# Patient Record
Sex: MALE | Race: BLACK OR AFRICAN AMERICAN | NOT HISPANIC OR LATINO | Employment: UNEMPLOYED | URBAN - METROPOLITAN AREA
[De-identification: names, ages, dates, MRNs, and addresses within clinical notes are randomized per-mention and may not be internally consistent; named-entity substitution may affect disease eponyms.]

---

## 2021-11-15 ENCOUNTER — HISTORICAL (OUTPATIENT)
Dept: ADMINISTRATIVE | Facility: HOSPITAL | Age: 41
End: 2021-11-15

## 2021-11-18 ENCOUNTER — HISTORICAL (OUTPATIENT)
Dept: ADMINISTRATIVE | Facility: HOSPITAL | Age: 41
End: 2021-11-18

## 2021-11-18 LAB
EST. AVERAGE GLUCOSE BLD GHB EST-MCNC: 105.4 MG/DL
HBA1C MFR BLD: 5.3 %

## 2021-11-22 ENCOUNTER — HISTORICAL (OUTPATIENT)
Dept: ADMINISTRATIVE | Facility: HOSPITAL | Age: 41
End: 2021-11-22

## 2021-11-22 LAB — SARS-COV-2 AG RESP QL IA.RAPID: NEGATIVE

## 2021-11-23 ENCOUNTER — HISTORICAL (OUTPATIENT)
Dept: SURGERY | Facility: HOSPITAL | Age: 41
End: 2021-11-23

## 2021-12-08 ENCOUNTER — HISTORICAL (OUTPATIENT)
Dept: ADMINISTRATIVE | Facility: HOSPITAL | Age: 41
End: 2021-12-08

## 2022-01-05 ENCOUNTER — HISTORICAL (OUTPATIENT)
Dept: ADMINISTRATIVE | Facility: HOSPITAL | Age: 42
End: 2022-01-05

## 2022-02-16 ENCOUNTER — HISTORICAL (OUTPATIENT)
Dept: ADMINISTRATIVE | Facility: HOSPITAL | Age: 42
End: 2022-02-16

## 2022-03-10 ENCOUNTER — HISTORICAL (OUTPATIENT)
Dept: PHYSICAL THERAPY | Facility: HOSPITAL | Age: 42
End: 2022-03-10

## 2022-03-18 ENCOUNTER — HISTORICAL (OUTPATIENT)
Dept: PHYSICAL THERAPY | Facility: HOSPITAL | Age: 42
End: 2022-03-18

## 2022-03-21 ENCOUNTER — HISTORICAL (OUTPATIENT)
Dept: PHYSICAL THERAPY | Facility: HOSPITAL | Age: 42
End: 2022-03-21

## 2022-03-23 ENCOUNTER — HISTORICAL (OUTPATIENT)
Dept: PHYSICAL THERAPY | Facility: HOSPITAL | Age: 42
End: 2022-03-23

## 2022-03-23 ENCOUNTER — HISTORICAL (OUTPATIENT)
Dept: ADMINISTRATIVE | Facility: HOSPITAL | Age: 42
End: 2022-03-23

## 2022-04-07 ENCOUNTER — HISTORICAL (OUTPATIENT)
Dept: ADMINISTRATIVE | Facility: HOSPITAL | Age: 42
End: 2022-04-07
Payer: MEDICAID

## 2022-04-24 VITALS — WEIGHT: 222.69 LBS | HEIGHT: 72 IN | BODY MASS INDEX: 30.16 KG/M2

## 2022-04-28 NOTE — H&P
Patient:   Codey Quiles             MRN: [id number]            FIN: [id number]               Age:   41 years     Sex:  Male     :  1980   Associated Diagnoses:   None   Author:   Kam Aaron MD      Chief Complaint   Left ankle   History of Present Illness   This is a 41-year-old male new patient here for evaluation left ankle injury. He fell in a pothole while getting out of his car 2 week ago. He was seen by his primary care doctor where x-rays were obtained that showed a lateral malleolus fracture. Of note, he has history of diabetes diagnosed about 3 to 4 months ago. He is on Metformin. He doesn't know what his most recent hemoglobin A1c was. He does not smoke cigarettes. No numbness or tingling. He has been in a cam boot. No other injuries from the incident. He is a  and has paperwork to be filled out for his job.   Review of Systems   Constitutional: no fever, fatigue, weakness  Eye: no vision loss, eye redness, drainage, or pain  ENMT: no sore throat, ear pain, sinus pain/congestion, nasal congestion/drainage  Respiratory: no cough, no wheezing, no shortness of breath  Cardiovascular: no chest pain, no palpitations, no edema  Gastrointestinal: no nausea, vomiting, or diarrhea. No abdominal pain    Physical Exam   Vitals & Measurements HT: 185.00 cm WT: 100.000 kg BMI: 29.22 Gen - NAD  CV - RR by palpable pulse  Resp - nonlabored breathing    Left lower extremity  No abrasions, lacerations, open wounds. Moderate swelling about the ankle. Tenderness over the lateral malleolus. Motor intact EHL/FHL/GS/TA. Sensation intact SP/DP/SA/SP/T. 2+ DP.    Imaging:  X-rays of the left ankle including gravity stress view today show a Nelson B lateral malleolus fracture with medial clear space widening on gravity stress view. Assessment/Plan Ankle fracture, left S82.803V   Ordered:   XR Ankle Left Minimum 3 Views, Routine, 11/15/21 12:35:00 CST, None, Ambulatory, Rad Type, Ankle fracture,  left, Not Scheduled, 11/15/21 12:35:00 CST   Patient has a left lateral malleolus fracture with possible syndesmotic injury. Risks and benefits of operative versus nonoperative treatment were discussed with the patient. He elected to proceed with surgery. Consent was obtained. We'll plan for open reduction internal fixation of the left lateral malleolus on 11/23/2021. Possible syndesmotic fixation with screws given that he is a diabetic.

## 2022-05-01 NOTE — HISTORICAL [facility] CERNER
This is a historical note converted from Ehsan. Formatting and pictures may have been removed.  Please reference Ehsan for original formatting and attached multimedia. DATE OF SURGERY:?11/23/21  ?  PREOPERATIVE DIAGNOSES:  1.?left?ankle fracture  ?   POSTOPERATIVE DIAGNOSES:  ?   Same  ?   PROCEDURE: Open reduction and internal fixation of ankle fracture  ?  ATTENDING PHYSICIAN:?Kam Aaron MD  ?  RESIDENT PHYSICIANS: Dr Brayan Rodriguez PGY3  ?  ANESTHESIA: Regional + General  ?  EBL: 20cc  ?  TOURNIQUET: 250 mmHg 63 min  ?  SPECIMEN: none  ?  IMPLANTS: Jovany variax fibular plate with associated 2.7mm locking and 3.5mm cortical screws.? 3.5 mm cortical screws for syndesmotic fixation.  ?  COMPLICATIONS: none  ?  DISPOSITION: To recovery room in stable condition  ?  ?  INDICATIONS FOR PROCEDURE:?Codey Quiles?is a?41 Years?Male?who presented to our clinic for follow up assessment and definitive management of their ankle fracture.? While in clinic the risks, benefits, outcomes, and alternatives of conservative versus operative management were discussed with the patient. ?Informed consent was obtained for an open reduction and?internal fixation of the?ankle fracture.  ?  PROCEDURE IN DETAIL: ?The patient was brought into the operating room, positioned supine on the OR table. ?General anesthetic was administered by our anesthesia colleagues. ?The? ankle was prepped and draped in the usual fashion. ?A preoperative pause was performed to confirm the site and side for surgery. ?Preoperative antibiotics were given.?The extremity was exsanguinated and tourniquet inflated to 250mmHg.  ?  An incision was made over the distal fibular fracture site. ?This was carried down through subcutaneous tissue and down onto bone. ?Full-thickness flaps were elevated. ?Blunt dissection was made proximally to the fracture to protect the superficial peroneal nerve. ?The fracture was exposed and irrigated. ?It was cleaned with a combination  of a?curette and small rongeurs.?  ?  FIBULAR FIXATION  ?  The fibular fracture was anatomically reduced with a combination of lobster claw and pointed reduction forceps. ?A single 2.7 mm lag screw was placed across the fracture site in an anterior to posterior direction. ?Utilizing a shelley variax fibular plate, we stabilized the fracture both proximally and distally with olives. ?Using intraoperative fluoroscopy, we confirmed that our fracture was anatomically reduced and that our hardware was in excellent position. ?We filled the plate distally with 2.7 mm locking screws and proximally with 3.5 mm locking screws.  ?  SYNDESMOSIS FIXATION  ?  Once we were satisfied with our fixation, we screened the ankle under intraoperative fluoroscopy. ?External rotation stress demonstrated widening of the syndesmosis as well as a medial clear space.? The syndesmosis was?reduced ? We placed 3x shelley 3.5mm cortical screws across the syndesmosis under direct fluoroscopic visualization.  ?  We then screened the ankle under intraoperative fluoroscopy to confirm that our fracture was reduced anatomically and that our hardware was in excellent position and well aligned. ?Under external rotation stress, there was no syndesmosis or medial clear space widening.?  ?  A thorough irrigation of the hardware and the wound was performed.? The subcutaneous tissue was closed with 2-0 Vicryl suture. ?Skin was closed with 3-0 nylon sutures.??A sterile dressing was applied and the patient was placed in a short-leg splint. ?General anesthetic was reversed. ?There were no intraoperative complications. ?Estimated blood loss was 20 cc. ?The patient was transferred to a hospital stretcher and brought to the recovery room in stable condition.  ?  I was present for all critical steps of the procedure  ?  POSTOPERATIVE PLAN: ?The patient will remain nonweightbearing on the ankle for a total of 6-8 weeks from the time of surgery. ?We will them?in clinic  in?2 weeks time to remove the splint, perform a wound check, remove sutures, and begin gentle range of motion exercises for the ankle.  ?

## 2022-05-14 NOTE — PROGRESS NOTES
Chief Complaint  left ankle pain  History of Present Illness  41-year-old male status post left ankle ORIF including syndesmotic screw fixation date of surgery 11/23/2021  ?  Plan for okay to shower,?follow-up 4 weeks?new x-rays, continue nonweightbearing left lower extremity.  ?  Patient is doing very well. ?No constitutional symptoms. ?No problems with his incision. ?His wife has been very diligent about checking his blood sugars which run in the 90s daily.? He is performing some range of motion exercise. ?He has been compliant with his nonweightbearing status.? He is here today for routine follow-up. ?He does have some?numbness in the superficial peroneal nerve distribution?with a positive Tinels sign at the proximal aspect of the incision.? His pain is somewhat controlled?but he does have significant pain at night. ?He is tried taking gabapentin?Tylenol and ibuprofen.  Review of Systems  Constitutional:?no fever, fatigue, weakness  Eye:?no vision loss, eye redness, drainage, or pain  ENMT:?no sore throat, ear pain, sinus pain/congestion, nasal congestion/drainage  Respiratory:?no cough, no wheezing, no shortness of breath  Cardiovascular:?no chest pain, no palpitations, no edema  Gastrointestinal:?no nausea, vomiting, or diarrhea. No abdominal pain  ?  ?  Physical Exam  Vitals & Measurements  HT:?184.00?cm? WT:?101.000?kg? BMI:?29.83?  Left ankle  ?  Surgical incision healed, clean dry intact. ?No signs of erythema dehiscence infection. ?There is retained nylon suture at the distal aspect of the incision  Dorsiflexion neutral  He has a little bit of tenderness still over the fracture site  No tenderness over the medial mall  No tenderness over deltoid CFL ATFL  SILT Sa/Larios/SP/DP/T  Motor Intact EHL/FHL/TA/GSC  +2 DP  ?  X-rays of the left ankle today demonstrate?interval callus formation.? The fibula is out to length. ?There is no medial clear space widening.? A small amount of talar tilt. ?No signs of hardware  failure or loosening.  Assessment/Plan  Postoperative state?Z98.890  41-year-old male status post left ankle ORIF including syndesmotic screw fixation date of surgery 11/23/2021  ?   Vitamin D calcium supplementation daily  Elevation  We will give him a small prescription for Percocet today?to be used only in extreme situations for pain control. ?Otherwise multimodal pain control especially at night  Heel cord stretching and range of motion as demonstrated for the patient  Reinforced the importance of strict nonweightbearing.? He is a diabetic?and has a propensity for slower healing.  ?   Follow-up: 6 weeks new x-rays of the?left ankle  ?   Weightbearing status: Strict nonweightbearing?10 to 12 weeks from date of surgery 11/23/2021  ?   ATTENDING ADDENDUM  ?   Codey Quiles?was evaluated at the time of the encounter with?Dr Wall.? HPI, PE and treatment plan was reviewed.? Treatment plan was reasonable and necessary.??Imaging was reviewed at the time of visit.??   Medications  acetaminophen-hydrocodone 325 mg-10 mg oral tablet, 1 tab(s), Oral, q6hr  acetaminophen-hydrocodone 325 mg-5 mg oral tablet, 1 tab(s), Oral, q4hr  acetaminophen-oxycodone 325 mg-5 mg oral tablet, Oral, q6hr  gabapentin 100 mg oral capsule, 100 mg= 1 cap(s), Oral, TID  glipiZIDE 5 mg oral tablet, 5 mg= 1 tab(s), Oral, BID  metFORMIN 1000 mg oral tablet, 1000 mg= 1 tab(s), Oral, BID  methocarbamol 750 mg oral tablet, 1500 mg= 2 tab(s), Oral, TID  ProAir HFA 90 mcg/inh inhalation aerosol with adapter, 2 puff(s), INH, q4hr, PRN  Toradol 10 mg oral tablet, 10 mg= 1 tab(s), Oral, q6hr,? ?Not taking

## 2025-04-10 ENCOUNTER — HOSPITAL ENCOUNTER (EMERGENCY)
Facility: HOSPITAL | Age: 45
Discharge: HOME | End: 2025-04-10
Attending: EMERGENCY MEDICINE
Payer: COMMERCIAL

## 2025-04-10 VITALS
SYSTOLIC BLOOD PRESSURE: 152 MMHG | OXYGEN SATURATION: 98 % | WEIGHT: 200 LBS | HEART RATE: 81 BPM | HEIGHT: 72 IN | RESPIRATION RATE: 20 BRPM | TEMPERATURE: 98 F | DIASTOLIC BLOOD PRESSURE: 102 MMHG | BODY MASS INDEX: 27.09 KG/M2

## 2025-04-10 DIAGNOSIS — H61.23 BILATERAL IMPACTED CERUMEN: Primary | ICD-10-CM

## 2025-04-10 LAB — POCT GLUCOSE: 95 MG/DL (ref 70–110)

## 2025-04-10 PROCEDURE — 82962 GLUCOSE BLOOD TEST: CPT

## 2025-04-10 PROCEDURE — 99283 EMERGENCY DEPT VISIT LOW MDM: CPT

## 2025-04-10 RX ORDER — OFLOXACIN 3 MG/ML
10 SOLUTION AURICULAR (OTIC) DAILY
Qty: 5 ML | Refills: 0 | Status: SHIPPED | OUTPATIENT
Start: 2025-04-10 | End: 2025-04-17

## 2025-04-10 NOTE — Clinical Note
"Codey Stantonamber Quiles was seen and treated in our emergency department on 4/10/2025.  He may return to work on 04/11/2025.       If you have any questions or concerns, please don't hesitate to call.      Daniela Rouse, NP"

## 2025-04-10 NOTE — ED PROVIDER NOTES
Encounter Date: 4/10/2025       History     Chief Complaint   Patient presents with    Ear Fullness     Right ear pain.     43 yo male with a h/o DM presents with a c/o left ear pain.  Onset yesterday.  Associated symptoms include decreased hearing.  No provocative or palliative factors reported.  Pt denies injury or trauma.  He denies fever, cough and congestion.    The history is provided by the patient. No  was used.     Review of patient's allergies indicates:  No Known Allergies  Past Medical History:   Diagnosis Date    Diabetes mellitus     Hypertension      History reviewed. No pertinent surgical history.  No family history on file.  Social History[1]  Review of Systems   Constitutional:  Negative for appetite change, chills and fever.   HENT:  Positive for ear pain (right ear). Negative for sore throat.    Respiratory:  Negative for cough and shortness of breath.    Cardiovascular:  Negative for chest pain.   Gastrointestinal:  Negative for abdominal pain, diarrhea, nausea and vomiting.   Genitourinary:  Negative for dysuria.   Musculoskeletal:  Negative for back pain.   Skin:  Negative for rash.   Neurological:  Negative for weakness and headaches.   Hematological:  Does not bruise/bleed easily.       Physical Exam     Initial Vitals [04/10/25 1723]   BP Pulse Resp Temp SpO2   (!) 152/102 81 20 98.2 °F (36.8 °C) 98 %      MAP       --         Physical Exam    Nursing note and vitals reviewed.  Constitutional: He appears well-developed and well-nourished.   HENT:   Head: Normocephalic and atraumatic.   Right Ear: External ear normal.   Left Ear: External ear normal.   Nose: Nose normal. Mouth/Throat: Oropharynx is clear and moist.   Eyes: Conjunctivae and EOM are normal. Pupils are equal, round, and reactive to light.   Neck: Neck supple.   Normal range of motion.  Cardiovascular:  Normal rate, regular rhythm, normal heart sounds and intact distal pulses.           Pulmonary/Chest:  Breath sounds normal.   Musculoskeletal:         General: Normal range of motion.      Cervical back: Normal range of motion and neck supple.     Neurological: He is alert and oriented to person, place, and time. He has normal strength.   Skin: Skin is warm and dry.         ED Course   Procedures  Labs Reviewed   POCT GLUCOSE       Result Value    POCT Glucose 95            Imaging Results    None          Medications - No data to display  Medical Decision Making  Differential diagnoses: Otitis media, otitis externa, cerumen impaction, foreign body in ear canal, ruptured TM  45 yo male with a h/o DM presents with a c/o left ear pain.  Onset yesterday.  Associated symptoms include decreased hearing.  No provocative or palliative factors reported.  Pt denies injury or trauma.  He denies fever, cough and congestion.  Physical exam as documented.  Unable to visualize bilateral TMs due to cerumen impaction.  Both ears flushed with warm water.  Moderate amount of cerumen extracted from bilateral ear canals.  Patient tolerated with minimal complaint.  I will place patient on ofloxacin in the event that he develops an otitis externa s/t flushing of the ear.  I've also suggesting OTC debrox to prevent future impactions.  I will discharge home to follow up with his PCP as needed.  He may return to the ED for worsening.  Patient verbalized understanding of the plan and agrees.    Risk  Prescription drug management.                                      Clinical Impression:  Final diagnoses:  [H61.23] Bilateral impacted cerumen (Primary)          ED Disposition Condition    Discharge Stable          ED Prescriptions       Medication Sig Dispense Start Date End Date Auth. Provider    ofloxacin (FLOXIN) 0.3 % otic solution Place 10 drops into both ears once daily. for 7 days 5 mL 4/10/2025 4/17/2025 Daniela Rouse NP          Follow-up Information       Follow up With Specialties Details Why Contact Info    Your primary care  provider  In 1 week As needed, For ED follow-up                [1]         Daniela Rouse NP  04/10/25 [id number]

## 2025-07-12 ENCOUNTER — HOSPITAL ENCOUNTER (EMERGENCY)
Facility: HOSPITAL | Age: 45
Discharge: HOME | End: 2025-07-12
Payer: COMMERCIAL

## 2025-07-12 VITALS
HEART RATE: 76 BPM | BODY MASS INDEX: 27.77 KG/M2 | TEMPERATURE: 97 F | OXYGEN SATURATION: 96 % | DIASTOLIC BLOOD PRESSURE: 77 MMHG | HEIGHT: 72 IN | RESPIRATION RATE: 18 BRPM | SYSTOLIC BLOOD PRESSURE: 128 MMHG | WEIGHT: 205 LBS

## 2025-07-12 DIAGNOSIS — R19.7 DIARRHEA OF PRESUMED INFECTIOUS ORIGIN: Primary | ICD-10-CM

## 2025-07-12 PROCEDURE — 99281 EMR DPT VST MAYX REQ PHY/QHP: CPT

## 2025-07-12 NOTE — Clinical Note
"Codey"Mckay Quiles was seen and treated in our emergency department on 7/12/2025.  He may return to work on 07/14/2025.  Please excuse Mr. Quiles from responsibilities from 7/11 to 7/14 due to an illness.     If you have any questions or concerns, please don't hesitate to call.      Drew Yee MD"

## 2025-07-12 NOTE — DISCHARGE INSTRUCTIONS
You likely have viral diarrhea. You may use over the counter Pepto Bismol as needed and stay hydrated. You may add probiotics. If your symptoms do not improve within 1 week seek care. Return to Emergency if your symptoms severely worsen.

## 2025-07-13 NOTE — ED PROVIDER NOTES
Encounter Date: 7/12/2025       History     Chief Complaint   Patient presents with    Diarrhea     Stomach cramping  and diarrhea started yesterday. Been around a family member with similar symptoms. Took immodium around 12, helped some.      46 y/o M w/pmhx DM, HTN complains of 3 days of nonbloody diarrhea w/o vomiting or fever. One sick contact with similar symptoms. He denies severe abd pain. Taking Pepto Bismol.        Review of patient's allergies indicates:  No Known Allergies  Past Medical History:   Diagnosis Date    Diabetes mellitus     Hypertension      History reviewed. No pertinent surgical history.  No family history on file.  Social History[1]  Review of Systems   Constitutional:  Negative for fever.   Gastrointestinal:  Positive for diarrhea. Negative for abdominal pain.   All other systems reviewed and are negative.      Physical Exam     Initial Vitals [07/12/25 1616]   BP Pulse Resp Temp SpO2   128/77 76 18 97.3 °F (36.3 °C) 96 %      MAP       --         Physical Exam    Constitutional: He appears well-developed and well-nourished.   HENT:   Head: Normocephalic and atraumatic.   Eyes: EOM are normal.   Cardiovascular:  Normal rate, regular rhythm and normal heart sounds.           No murmur heard.  Pulmonary/Chest: Breath sounds normal. No respiratory distress. He has no wheezes. He has no rhonchi. He has no rales.   Abdominal: Abdomen is soft. There is no abdominal tenderness.   Musculoskeletal:         General: No tenderness or edema. Normal range of motion.     Neurological: He is alert and oriented to person, place, and time. GCS score is 15. GCS eye subscore is 4. GCS verbal subscore is 5. GCS motor subscore is 6.   Psychiatric: He has a normal mood and affect. His behavior is normal. Judgment and thought content normal.         ED Course   Procedures  Labs Reviewed - No data to display       Imaging Results    None          Medications - No data to display  Medical Decision Making  46 y/o  M w/pmhx DM, HTN complains of 3 days of nonbloody diarrhea w/o vomiting or fever. One sick contact with similar symptoms. He denies severe abd pain. Taking Pepto Bismol. No recent abx.  VSS  Exam unremarkable.  DDX includes, but not limited to AGE, osmotic diarrhea, allergy, abx use, med s/e.  Patient has sick contact, no recent med changes.  Can treat symptomatically.   Stable for discharge.                                          Clinical Impression:  Final diagnoses:  [R19.7] Diarrhea of presumed infectious origin (Primary)          ED Disposition Condition    Discharge Stable          ED Prescriptions    None       Follow-up Information       Follow up With Specialties Details Why Contact Info    [facility] - Emergency Dept Emergency Medicine  As needed [address] [facility] [address] [phone]                   [1]   Social History  Tobacco Use    Smoking status: Every Day     Current packs/day: 0.50     Types: Cigarettes    Smokeless tobacco: Never        Drew Yee MD  07/12/25 1959